# Patient Record
Sex: FEMALE | Race: WHITE | NOT HISPANIC OR LATINO | Employment: FULL TIME | URBAN - METROPOLITAN AREA
[De-identification: names, ages, dates, MRNs, and addresses within clinical notes are randomized per-mention and may not be internally consistent; named-entity substitution may affect disease eponyms.]

---

## 2018-06-09 ENCOUNTER — HOSPITAL ENCOUNTER (EMERGENCY)
Facility: HOSPITAL | Age: 58
Discharge: HOME | End: 2018-06-09
Payer: COMMERCIAL

## 2018-06-09 VITALS
HEIGHT: 66 IN | SYSTOLIC BLOOD PRESSURE: 126 MMHG | WEIGHT: 198 LBS | TEMPERATURE: 98 F | RESPIRATION RATE: 20 BRPM | BODY MASS INDEX: 31.82 KG/M2 | HEART RATE: 70 BPM | OXYGEN SATURATION: 98 % | DIASTOLIC BLOOD PRESSURE: 68 MMHG

## 2018-06-09 DIAGNOSIS — R03.0 ELEVATED BLOOD PRESSURE READING: ICD-10-CM

## 2018-06-09 DIAGNOSIS — S93.402A SPRAIN OF LEFT ANKLE, UNSPECIFIED LIGAMENT, INITIAL ENCOUNTER: Primary | ICD-10-CM

## 2018-06-09 DIAGNOSIS — M25.572 LEFT ANKLE PAIN: ICD-10-CM

## 2018-06-09 DIAGNOSIS — S80.11XA CONTUSION OF RIGHT LOWER LEG, INITIAL ENCOUNTER: ICD-10-CM

## 2018-06-09 DIAGNOSIS — M79.672 LEFT FOOT PAIN: ICD-10-CM

## 2018-06-09 PROCEDURE — 99283 EMERGENCY DEPT VISIT LOW MDM: CPT

## 2018-06-09 RX ORDER — LORATADINE 10 MG/1
10 TABLET ORAL DAILY
COMMUNITY

## 2018-06-09 RX ORDER — DICLOFENAC SODIUM 75 MG/1
75 TABLET, DELAYED RELEASE ORAL 2 TIMES DAILY
Qty: 10 TABLET | Refills: 0 | Status: SHIPPED | OUTPATIENT
Start: 2018-06-09

## 2018-06-09 RX ORDER — OMEPRAZOLE 10 MG/1
10 CAPSULE, DELAYED RELEASE ORAL DAILY
COMMUNITY
End: 2023-03-01

## 2018-06-09 RX ORDER — CYCLOBENZAPRINE HCL 10 MG
10 TABLET ORAL 3 TIMES DAILY PRN
COMMUNITY
End: 2023-03-01

## 2018-06-09 RX ORDER — HYDROCODONE BITARTRATE AND ACETAMINOPHEN 5; 325 MG/1; MG/1
1 TABLET ORAL EVERY 6 HOURS PRN
COMMUNITY

## 2018-06-10 NOTE — ED PROVIDER NOTES
"SCRIBE #1 NOTE: I, Felton Ivan, am scribing for, and in the presence of, AZALIA Bartholomew. I have scribed the entire note.     History      Chief Complaint   Patient presents with    Fall     left foot pain, bruise to right lower leg       Review of patient's allergies indicates:  No Known Allergies     HPI   HPI    6/9/2018, 7:30 PM   History obtained from the patient      History of Present Illness: Meghan Mello is a 57 y.o. female patient who presents to the Emergency Department for left foot pain which onset suddenly this PM. Pt reports that her sxs onset after "twisting my ankle and falling to the ground," Symptoms are constant and moderate in severity. Sxs are worsened w/ movement. No mitigating factors reported. Pt also complains of ecchymosis to the RLE. Patient denies any decreased ROM, fever, chills, n/v/d/ focal weakness, and all other sxs at this time. No further complaints or concerns at this time.       Arrival mode: Personal vehicle    PCP: [facility]       Past Medical History:  History reviewed. No pertinent past medical history.    Past Surgical History:  Past Surgical History:   Procedure Laterality Date    APPENDECTOMY      BLADDER SURGERY      EYE SURGERY      HYSTERECTOMY      OVARIAN CYST REMOVAL      TONSILLECTOMY      WISDOM TOOTH EXTRACTION           Family History:  History reviewed. No pertinent family history.    Social History:  Social History     Social History Main Topics    Smoking status: Never Smoker    Smokeless tobacco: Never Used    Alcohol use Yes      Comment: rarely    Drug use: Unknown    Sexual activity: Not on file       ROS   Review of Systems   Constitutional: Negative for chills and fever.   Respiratory: Negative for shortness of breath.    Cardiovascular: Negative for chest pain.   Gastrointestinal: Negative for abdominal pain, diarrhea, nausea and vomiting.   Genitourinary: Negative for difficulty urinating and urgency. "   Musculoskeletal:        + left foot pain   Skin: Positive for color change (ecchymosis noted to the RLE).   Neurological: Negative for dizziness, weakness, light-headedness, numbness and headaches.   All other systems reviewed and are negative.      Physical Exam      Initial Vitals [06/09/18 1848]   BP Pulse Resp Temp SpO2   123/67 73 16 97.9 °F (36.6 °C) 97 %      MAP       85.67          Physical Exam  Nursing Notes and Vital Signs Reviewed.  Constitutional: Patient is in no apparent distress. Well-developed and well-nourished.  Head: Atraumatic. Normocephalic.  Eyes: PERRL. EOM intact. Conjunctivae are not pale. No scleral icterus.  ENT: Mucous membranes are moist. Oropharynx is clear and symmetric.    Neck: Supple. Full ROM. No lymphadenopathy.  Cardiovascular: Regular rate. Regular rhythm. No murmurs, rubs, or gallops. Distal pulses are 2+ and symmetric.  Pulmonary/Chest: No respiratory distress. Clear to auscultation bilaterally. No wheezing or rales.  Abdominal: Soft and non-distended.  There is no tenderness.  No rebound, guarding, or rigidity. Good bowel sounds.  Musculoskeletal: Moves all extremities. No obvious deformities. No edema. No calf tenderness.  Right Foot:  No obvious deformity. Tenderness to palpation over medial left ankle. Pain w/ flexion and extension. There is no swelling. Ankle dorsiflexion and ankle plantar flexion are intact.  Intact sensation to light touch. Distal capillary refill takes less than 2 seconds.  PT and DP pulses are 2+ bilaterally  Skin: Warm and dry. Ecchymosis noted to the right calf.   Neurological:  Alert, awake, and appropriate.  Normal speech.  No acute focal neurological deficits are appreciated.  Psychiatric: Normal affect. Good eye contact. Appropriate in content.    ED Course    Orthopedic Injury  Date/Time: 6/9/2018 8:31 PM  Performed by: ISIDORO ARORA  Authorized by: ANABELLE LIAO.     Consent Done?:  Yes  Universal Protocol:     Verbal consent obtained?:  "Yes      Consent given by:  Patient    Patient identity confirmed:  Name and   Injury:     Injury location:  Ankle    Location details:  Left ankle    Injury type:  Soft tissue      Pre-procedure assessment:     Neurovascular status: Neurovascularly intact      Distal perfusion: normal      Neurological function: normal      Range of motion: reduced      Local anesthesia used?: No      Patient sedated?: No        Selections made in this section will also lock the Injury type section above.:     Immobilization: ACE Wrap.    Complications: No      Specimens: No      Implants: No    Post-procedure assessment:     Neurovascular status: Neurovascularly intact      Distal perfusion: normal      Neurological function: normal      Range of motion: improved      Patient tolerance:  Patient tolerated the procedure well with no immediate complications      ED Vital Signs:  Vitals:    18 1848 18   BP: 123/67 126/68   Pulse: 73 70   Resp: 16 20   Temp: 97.9 °F (36.6 °C)    TempSrc: Oral    SpO2: 97% 98%   Weight: 89.8 kg (197 lb 15.6 oz)    Height: 5' 6" (1.676 m)      Imaging Results:  Imaging Results          X-Ray Foot Complete Left (Final result)  Result time 18 20:14:53    Final result by Iam Hawk MD (18 20:14:53)                 Impression:      1.  Negative for acute process.      Electronically signed by: Iam Hawk MD  Date:    2018  Time:    20:14             Narrative:    EXAMINATION:  XR FOOT COMPLETE 3 VIEW LEFT    CLINICAL HISTORY:  .  Pain in left foot    TECHNIQUE:  AP, lateral and oblique views of the left foot were performed.    COMPARISON:  None    FINDINGS:  Three images are provided for review.  Joint spaces are well maintained.  Fusion of the middle and distal phalanges of the 5th toe noted.                               X-Ray Ankle Complete Left (Final result)  Result time 18 20:13:44    Final result by Iam Hawk MD (18 20:13:44)           "       Impression:      1.  Negative for acute process.      Electronically signed by: Iam Hawk MD  Date:    06/09/2018  Time:    20:13             Narrative:    EXAMINATION:  XR ANKLE COMPLETE 3 VIEW LEFT    CLINICAL HISTORY:  Pain in left ankle and joints of left foot    TECHNIQUE:  AP, lateral and oblique views of the left ankle were performed.    COMPARISON:  None    FINDINGS:  Three images were provided for review.  Mortise is intact.  Talar dome is smooth.  Achilles calcaneal spur.    Negative for fracture or dislocation.                                        The Emergency Provider reviewed the vital signs and test results, which are outlined above.    ED Discussion     8:31 PM: Reassessed pt at this time. Discussed with pt all pertinent ED information and results. Patient states she has acess to crutches at home so she does not need any to go home with today.  Patient has Norco and Flexeril available at home if needed. Discussed pt dx and plan of tx. Gave pt all f/u and return to the ED instructions. All questions and concerns were addressed at this time. Pt expresses understanding of information and instructions, and is comfortable with plan to discharge. Pt is stable for discharge.    I discussed with patient and/or family/caretaker that evaluation in the ED does not suggest any emergent or life threatening medical conditions requiring immediate intervention beyond what was provided in the ED, and I believe patient is safe for discharge.  Regardless, an unremarkable evaluation in the ED does not preclude the development or presence of a serious of life threatening condition. As such, patient was instructed to return immediately for any worsening or change in current symptoms.    Pre-hypertension/Hypertension: The pt has been informed that they may have pre-hypertension or hypertension based on a blood pressure reading in the ED. I recommend that the pt call the PCP listed on their discharge  instructions or a physician of their choice this week to arrange f/u for further evaluation of possible pre-hypertension or hypertension.       ED Medication(s):  Medications - No data to display    Discharge Medication List as of 6/9/2018  8:31 PM      START taking these medications    Details   diclofenac (VOLTAREN) 75 MG EC tablet Take 1 tablet (75 mg total) by mouth 2 (two) times daily., Starting Sat 6/9/2018, Print             Follow-up Information     [facility] In 3 days.                   Medical Decision Making    Medical Decision Making:   Clinical Tests:   Radiological Study: Ordered and Reviewed           Scribe Attestation:   Scribe #1: I performed the above scribed service and the documentation accurately describes the services I performed. I attest to the accuracy of the note.    Attending:   Physician Attestation Statement for Scribe #1: I, AZALIA Bartholomew, personally performed the services described in this documentation, as scribed by Felton Ivan, in my presence, and it is both accurate and complete.          Clinical Impression       ICD-10-CM ICD-9-CM   1. Sprain of left ankle, unspecified ligament, initial encounter S93.402A 845.00   2. Left ankle pain M25.572 719.47   3. Left foot pain M79.672 729.5   4. Contusion of right lower leg, initial encounter S80.11XA 924.10   5. Elevated blood pressure reading R03.0 796.2       Disposition:   Disposition: Discharged  Condition: Stable         Shey Lewis PA-C  06/09/18 [id number]